# Patient Record
Sex: MALE | Race: WHITE | NOT HISPANIC OR LATINO | Employment: STUDENT | URBAN - METROPOLITAN AREA
[De-identification: names, ages, dates, MRNs, and addresses within clinical notes are randomized per-mention and may not be internally consistent; named-entity substitution may affect disease eponyms.]

---

## 2020-02-15 ENCOUNTER — HOSPITAL ENCOUNTER (EMERGENCY)
Facility: CLINIC | Age: 20
Discharge: HOME | End: 2020-02-16
Attending: EMERGENCY MEDICINE | Admitting: EMERGENCY MEDICINE
Payer: COMMERCIAL

## 2020-02-15 DIAGNOSIS — R10.11 RUQ ABDOMINAL PAIN: ICD-10-CM

## 2020-02-15 PROCEDURE — 76705 ECHO EXAM OF ABDOMEN: CPT | Mod: 26 | Performed by: EMERGENCY MEDICINE

## 2020-02-15 PROCEDURE — 99285 EMERGENCY DEPT VISIT HI MDM: CPT | Mod: 25 | Performed by: EMERGENCY MEDICINE

## 2020-02-15 PROCEDURE — 96375 TX/PRO/DX INJ NEW DRUG ADDON: CPT

## 2020-02-15 PROCEDURE — 99285 EMERGENCY DEPT VISIT HI MDM: CPT | Mod: 25

## 2020-02-15 PROCEDURE — 96374 THER/PROPH/DIAG INJ IV PUSH: CPT

## 2020-02-15 PROCEDURE — 76705 ECHO EXAM OF ABDOMEN: CPT

## 2020-02-15 NOTE — ED AVS SNAPSHOT
[facility] Emergency Department  [address] [facility] [address]  Phone:  [phone]  Fax:  [phone]                                    Marcell Ochoa   MRN: [id number]    Department:  [facility] Emergency Department   Date of Visit:  2/15/2020           After Visit Summary Signature Page    I have received my discharge instructions, and my questions have been answered. I have discussed any challenges I see with this plan with the nurse or doctor.    ..........................................................................................................................................  Patient/Patient Representative Signature      ..........................................................................................................................................  Patient Representative Print Name and Relationship to Patient    ..................................................               ................................................  Date                                   Time    ..........................................................................................................................................  Reviewed by Signature/Title    ...................................................              ..............................................  Date                                               Time          22EPIC Rev 08/18

## 2020-02-16 ENCOUNTER — ANCILLARY PROCEDURE (OUTPATIENT)
Dept: ULTRASOUND IMAGING | Facility: CLINIC | Age: 20
End: 2020-02-16
Attending: EMERGENCY MEDICINE

## 2020-02-16 VITALS
DIASTOLIC BLOOD PRESSURE: 58 MMHG | RESPIRATION RATE: 22 BRPM | BODY MASS INDEX: 25.09 KG/M2 | OXYGEN SATURATION: 95 % | HEART RATE: 72 BPM | TEMPERATURE: 97.7 F | WEIGHT: 165 LBS | SYSTOLIC BLOOD PRESSURE: 118 MMHG

## 2020-02-16 LAB
ALBUMIN SERPL-MCNC: 4.2 G/DL (ref 3.4–5)
ALP SERPL-CCNC: 72 U/L (ref 40–150)
ALT SERPL W P-5'-P-CCNC: 23 U/L (ref 0–70)
ANION GAP SERPL CALCULATED.3IONS-SCNC: 6 MMOL/L (ref 3–14)
AST SERPL W P-5'-P-CCNC: 24 U/L (ref 0–45)
BASOPHILS # BLD AUTO: 0.1 10E9/L (ref 0–0.2)
BASOPHILS NFR BLD AUTO: 0.4 %
BILIRUB SERPL-MCNC: 0.5 MG/DL (ref 0.2–1.3)
BUN SERPL-MCNC: 20 MG/DL (ref 7–30)
CALCIUM SERPL-MCNC: 9 MG/DL (ref 8.5–10.1)
CHLORIDE SERPL-SCNC: 108 MMOL/L (ref 94–109)
CO2 SERPL-SCNC: 26 MMOL/L (ref 20–32)
CREAT SERPL-MCNC: 1.15 MG/DL (ref 0.66–1.25)
DIFFERENTIAL METHOD BLD: ABNORMAL
EOSINOPHIL # BLD AUTO: 0.1 10E9/L (ref 0–0.7)
EOSINOPHIL NFR BLD AUTO: 0.7 %
ERYTHROCYTE [DISTWIDTH] IN BLOOD BY AUTOMATED COUNT: 12.1 % (ref 10–15)
GFR SERPL CREATININE-BSD FRML MDRD: >90 ML/MIN/{1.73_M2}
GLUCOSE SERPL-MCNC: 103 MG/DL (ref 70–99)
HCT VFR BLD AUTO: 45 % (ref 40–53)
HGB BLD-MCNC: 15.4 G/DL (ref 13.3–17.7)
IMM GRANULOCYTES # BLD: 0 10E9/L (ref 0–0.4)
IMM GRANULOCYTES NFR BLD: 0.3 %
LIPASE SERPL-CCNC: 56 U/L (ref 73–393)
LYMPHOCYTES # BLD AUTO: 3.3 10E9/L (ref 0.8–5.3)
LYMPHOCYTES NFR BLD AUTO: 23.7 %
MCH RBC QN AUTO: 31.4 PG (ref 26.5–33)
MCHC RBC AUTO-ENTMCNC: 34.2 G/DL (ref 31.5–36.5)
MCV RBC AUTO: 92 FL (ref 78–100)
MONOCYTES # BLD AUTO: 0.9 10E9/L (ref 0–1.3)
MONOCYTES NFR BLD AUTO: 6.5 %
NEUTROPHILS # BLD AUTO: 9.6 10E9/L (ref 1.6–8.3)
NEUTROPHILS NFR BLD AUTO: 68.4 %
NRBC # BLD AUTO: 0 10*3/UL
NRBC BLD AUTO-RTO: 0 /100
PLATELET # BLD AUTO: 343 10E9/L (ref 150–450)
POTASSIUM SERPL-SCNC: 3.7 MMOL/L (ref 3.4–5.3)
PROT SERPL-MCNC: 7.4 G/DL (ref 6.8–8.8)
RBC # BLD AUTO: 4.91 10E12/L (ref 4.4–5.9)
SODIUM SERPL-SCNC: 140 MMOL/L (ref 133–144)
WBC # BLD AUTO: 14 10E9/L (ref 4–11)

## 2020-02-16 PROCEDURE — 83690 ASSAY OF LIPASE: CPT | Performed by: EMERGENCY MEDICINE

## 2020-02-16 PROCEDURE — 80053 COMPREHEN METABOLIC PANEL: CPT | Performed by: EMERGENCY MEDICINE

## 2020-02-16 PROCEDURE — 85025 COMPLETE CBC W/AUTO DIFF WBC: CPT | Performed by: EMERGENCY MEDICINE

## 2020-02-16 PROCEDURE — 25000128 H RX IP 250 OP 636: Performed by: EMERGENCY MEDICINE

## 2020-02-16 RX ORDER — KETOROLAC TROMETHAMINE 15 MG/ML
15 INJECTION, SOLUTION INTRAMUSCULAR; INTRAVENOUS ONCE
Status: COMPLETED | OUTPATIENT
Start: 2020-02-16 | End: 2020-02-16

## 2020-02-16 RX ORDER — MORPHINE SULFATE 2 MG/ML
2-4 INJECTION, SOLUTION INTRAMUSCULAR; INTRAVENOUS EVERY 30 MIN PRN
Status: DISCONTINUED | OUTPATIENT
Start: 2020-02-16 | End: 2020-02-16 | Stop reason: HOSPADM

## 2020-02-16 RX ORDER — FENTANYL CITRATE 50 UG/ML
50-100 INJECTION, SOLUTION INTRAMUSCULAR; INTRAVENOUS EVERY 5 MIN PRN
Status: DISCONTINUED | OUTPATIENT
Start: 2020-02-16 | End: 2020-02-16 | Stop reason: HOSPADM

## 2020-02-16 RX ADMIN — FENTANYL CITRATE 100 MCG: 50 INJECTION, SOLUTION INTRAMUSCULAR; INTRAVENOUS at 00:07

## 2020-02-16 RX ADMIN — KETOROLAC TROMETHAMINE 15 MG: 15 INJECTION, SOLUTION INTRAMUSCULAR; INTRAVENOUS at 00:40

## 2020-02-16 RX ADMIN — MORPHINE SULFATE 2 MG: 2 INJECTION, SOLUTION INTRAMUSCULAR; INTRAVENOUS at 00:42

## 2020-02-16 ASSESSMENT — ENCOUNTER SYMPTOMS
HEADACHES: 0
BACK PAIN: 0
CHEST TIGHTNESS: 0
LIGHT-HEADEDNESS: 0
WOUND: 0
NUMBNESS: 0
CONSTIPATION: 0
NECK STIFFNESS: 0
NAUSEA: 0
APPETITE CHANGE: 0
VOMITING: 0
FATIGUE: 0
COUGH: 1
DIARRHEA: 0
DYSURIA: 0
ABDOMINAL PAIN: 1
CHILLS: 0
FEVER: 0
SHORTNESS OF BREATH: 0
WEAKNESS: 0

## 2020-02-16 NOTE — DISCHARGE INSTRUCTIONS
The exact cause of your pain is not entirely clear but appears to be due to spasms of your gallbladder.  I recommend following up with your primary care provider with consideration for obtaining another ultrasound as an outpatient if you have any further symptoms.

## 2020-02-16 NOTE — ED PROVIDER NOTES
"  History     Chief Complaint   Patient presents with     Abdominal Pain     RUQ- after eating a large meal.  Tried going into a hot tub and vomiting with no relief.  Pain is described as \"like I got a rib broken, but I don't.  Like a cramp\"     HPI  Marcell Ochoa is a 19 year old male with no significant past medical history presenting for evaluation of severe right upper quadrant abdominal pain.  Symptoms began acutely approximately 1 hour after having a large greasy meal.  Patient reports acute onset of sharp pain in the right upper quadrant.  Patient reports some slight waxing waning of the pain initially but has become more constant and severe.  He reports sharp pain underneath his right chest wall without any radiation or migration.  Patient denies any nausea or vomiting but does report he tried to induce vomiting without success.  Patient also reports he tried to have a bowel movement but was unable.  Patient reports his last normal bowel movement was earlier today and was normal.  Denies fever or chills.  Denies any previous similar symptoms in the past.  No personal or family history of gallbladder problems.  No previous history of surgery.  No known bad food exposure.  Pain has become progressively worsened in the same area so patient came in for evaluation.  Denies chest pain.  Has had a mild upper respiratory symptoms for a week but this is unchanged.  Denies any cough or difficulty breathing.    Allergies:  No Known Allergies    Problem List:    There are no active problems to display for this patient.       Past Medical History:    Past Medical History:   Diagnosis Date     NO ACTIVE PROBLEMS        Past Surgical History:    Past Surgical History:   Procedure Laterality Date     NO HISTORY OF SURGERY         Family History:    Family History   Problem Relation Age of Onset     Eye Disorder Maternal Grandmother         glaucoma     Respiratory Maternal Grandmother         emphycema     Diabetes Maternal " Grandfather      Hypertension Maternal Grandfather      Arthritis Maternal Grandfather      Eye Disorder Maternal Grandfather         cataracts/optic nerve stroke     Heart Disease Maternal Grandfather         stints put in     Respiratory Maternal Grandfather         sleep apnea     Thyroid Disease Paternal Grandmother      Neurologic Disorder Paternal Grandfather         lymes disease     Neurologic Disorder Other         down syndrome     Diabetes Other        Social History:  Marital Status:  Single [1]  Social History     Tobacco Use     Smoking status: Never Smoker     Smokeless tobacco: Never Used   Substance Use Topics     Alcohol use: Yes     Comment: Alcohol tonight- 2 beers     Drug use: No        Medications:    NO ACTIVE MEDICATIONS          Review of Systems   Constitutional: Negative for appetite change, chills, fatigue and fever.   HENT: Negative for congestion.    Respiratory: Positive for cough (mild). Negative for chest tightness and shortness of breath.    Cardiovascular: Negative for chest pain.   Gastrointestinal: Positive for abdominal pain. Negative for constipation, diarrhea, nausea and vomiting.   Genitourinary: Negative for decreased urine volume, dysuria, scrotal swelling and testicular pain.   Musculoskeletal: Negative for back pain and neck stiffness.   Skin: Negative for rash and wound.   Neurological: Negative for weakness, light-headedness, numbness and headaches.   All other systems reviewed and are negative.      Physical Exam   BP: 125/61  Pulse: 66  Heart Rate: 90  Temp: 97.7  F (36.5  C)  Resp: 22  Weight: 74.8 kg (165 lb)  SpO2: 98 %      Physical Exam  Vitals signs and nursing note reviewed.   Constitutional:       General: He is in acute distress.      Appearance: He is well-developed. He is diaphoretic.      Comments: Patient appears acutely uncomfortable, mildly pale and slightly diaphoretic   HENT:      Head: Normocephalic and atraumatic.      Mouth/Throat:      Mouth:  Mucous membranes are moist.   Cardiovascular:      Rate and Rhythm: Normal rate.      Heart sounds: Normal heart sounds.   Pulmonary:      Effort: Pulmonary effort is normal.      Breath sounds: Normal breath sounds.   Abdominal:      General: Abdomen is flat. Bowel sounds are increased.      Palpations: Abdomen is soft.      Tenderness: There is abdominal tenderness (Notably tender in the right upper quadrant without tenderness in the rest of the abdomen) in the right upper quadrant. There is no rebound. Positive signs include Rowe's sign.   Skin:     General: Skin is warm.      Capillary Refill: Capillary refill takes less than 2 seconds.   Neurological:      Mental Status: He is alert and oriented to person, place, and time.   Psychiatric:         Mood and Affect: Mood normal.         ED Course        Procedures           Results for orders placed or performed during the hospital encounter of 02/15/20 (from the past 24 hour(s))   CBC with platelets differential   Result Value Ref Range    WBC 14.0 (H) 4.0 - 11.0 10e9/L    RBC Count 4.91 4.4 - 5.9 10e12/L    Hemoglobin 15.4 13.3 - 17.7 g/dL    Hematocrit 45.0 40.0 - 53.0 %    MCV 92 78 - 100 fl    MCH 31.4 26.5 - 33.0 pg    MCHC 34.2 31.5 - 36.5 g/dL    RDW 12.1 10.0 - 15.0 %    Platelet Count 343 150 - 450 10e9/L    Diff Method Automated Method     % Neutrophils 68.4 %    % Lymphocytes 23.7 %    % Monocytes 6.5 %    % Eosinophils 0.7 %    % Basophils 0.4 %    % Immature Granulocytes 0.3 %    Nucleated RBCs 0 0 /100    Absolute Neutrophil 9.6 (H) 1.6 - 8.3 10e9/L    Absolute Lymphocytes 3.3 0.8 - 5.3 10e9/L    Absolute Monocytes 0.9 0.0 - 1.3 10e9/L    Absolute Eosinophils 0.1 0.0 - 0.7 10e9/L    Absolute Basophils 0.1 0.0 - 0.2 10e9/L    Abs Immature Granulocytes 0.0 0 - 0.4 10e9/L    Absolute Nucleated RBC 0.0    Comprehensive metabolic panel   Result Value Ref Range    Sodium 140 133 - 144 mmol/L    Potassium 3.7 3.4 - 5.3 mmol/L    Chloride 108 94 - 109  mmol/L    Carbon Dioxide 26 20 - 32 mmol/L    Anion Gap 6 3 - 14 mmol/L    Glucose 103 (H) 70 - 99 mg/dL    Urea Nitrogen 20 7 - 30 mg/dL    Creatinine 1.15 0.66 - 1.25 mg/dL    GFR Estimate >90 >60 mL/min/[1.73_m2]    GFR Estimate If Black >90 >60 mL/min/[1.73_m2]    Calcium 9.0 8.5 - 10.1 mg/dL    Bilirubin Total 0.5 0.2 - 1.3 mg/dL    Albumin 4.2 3.4 - 5.0 g/dL    Protein Total 7.4 6.8 - 8.8 g/dL    Alkaline Phosphatase 72 40 - 150 U/L    ALT 23 0 - 70 U/L    AST 24 0 - 45 U/L   Lipase   Result Value Ref Range    Lipase 56 (L) 73 - 393 U/L   POC US ABDOMEN LIMITED    [facility] Procedure Note      Limited Bedside ED Gallbladder  Ultrasound:    PROCEDURE: PERFORMED BY: Dr. Petr Sunshine MD  INDICATIONS:  RUQ/Epigastric Pain  PROBE:  Low frequency convex probe  BODY LOCATION: Abdomen  FINDINGS:   An ultrasound of the gallbladder was performed using longitudinal and transverse views.  Gallstone(s):  No large stones present. Some shadowing present near the neck of the gallbladder may indicated some small stones in this location  Gallbladder sludge:  Absent  Sonographic Rowe's sign:  Present  Gallbladder wall thickening (greater than 4 mm):  Absent  Pericholecystic fluid: Absent  Common bile duct (dilated if internal diameter greater than 6 mm): 4.4 mm  INTERPRETATION: The gallbladder is dilated and shows some shadowing in the neck of the gallbladder suggestive of some small stones and patient does appear to have a sonographic Rowe sign however, there is no other evidence to suggest acute cholecystitis.  IMAGE DOCUMENTATION: Images were archived to PACs system.           Medications   fentaNYL (PF) (SUBLIMAZE) injection  mcg (100 mcg Intravenous Given 2/16/20 0007)   morphine (PF) injection 2-4 mg (2 mg Intravenous Given 2/16/20 0042)   ketorolac (TORADOL) injection 15 mg (15 mg Intravenous Given 2/16/20 0040)      1:59 AM Patient re-assessed: Pain fully resolved.  Slight  tenderness in the right upper quadrant on palpation but only minimal.      Assessments & Plan (with Medical Decision Making)  19-year-old male previously healthy presenting for evaluation of acute onset of severe right upper quadrant abdominal pain symptoms tonight after eating a large greasy meal.  Patient notably tender in the right upper quadrant with a positive Rowe's sign.  Symptoms suggestive of biliary colic.  Bedside ultrasound showed 2 small shadowing stones near the neck of the gallbladder with a distended gallbladder but no evidence of cholecystitis.  Labs showed a mild white count but no evidence of elevated LFTs.  Patient's pain controlled with medications as above with complete resolution of his pain.  Symptoms highly suggestive of biliary colic although only the small shadowing stones identified on ultrasound.  Recommended primary care follow-up with consideration for repeat ultrasound as an outpatient, especially if he has recurrent symptoms.  Patient also advised that he has severe recurrent symptoms that do not resolve within half an hour to an hour, he should return for repeat evaluation.     I have reviewed the nursing notes.    I have reviewed the findings, diagnosis, plan and need for follow up with the patient.       New Prescriptions    No medications on file       Final diagnoses:   RUQ abdominal pain - Possible biliary colic       2/15/2020   [facility] EMERGENCY DEPARTMENT     Sunshine, Petr Faith MD  02/16/20 1214

## 2020-02-21 ENCOUNTER — OFFICE VISIT (OUTPATIENT)
Dept: FAMILY MEDICINE | Facility: CLINIC | Age: 20
End: 2020-02-21
Payer: COMMERCIAL

## 2020-02-21 VITALS
SYSTOLIC BLOOD PRESSURE: 124 MMHG | DIASTOLIC BLOOD PRESSURE: 70 MMHG | WEIGHT: 167 LBS | HEIGHT: 68 IN | HEART RATE: 76 BPM | BODY MASS INDEX: 25.31 KG/M2 | TEMPERATURE: 97 F

## 2020-02-21 DIAGNOSIS — R10.11 RUQ ABDOMINAL PAIN: Primary | ICD-10-CM

## 2020-02-21 DIAGNOSIS — K80.50 BILIARY COLIC: ICD-10-CM

## 2020-02-21 PROCEDURE — 99203 OFFICE O/P NEW LOW 30 MIN: CPT | Performed by: PHYSICIAN ASSISTANT

## 2020-02-21 ASSESSMENT — MIFFLIN-ST. JEOR: SCORE: 1742.01

## 2020-02-21 ASSESSMENT — PAIN SCALES - GENERAL: PAINLEVEL: NO PAIN (0)

## 2020-02-21 NOTE — PATIENT INSTRUCTIONS
I think things are going the right direction    No further work up needed if things continue to improve. Resume your normal diet and activity level     IF you continue to have some nagging pain or symptoms, especially notable after eating, let me know as I would want to repeat the ultrasound

## 2020-02-21 NOTE — PROGRESS NOTES
"Subjective     Marcell Ochoa is a 19 year old male who presents to clinic today for the following health issues:    HPI   ED/UC Followup:    Facility:  [facility] Emergency Department   Date of visit: 2/15/2020   Reason for visit: Abdominal Pain (R)   Current Status: He said it has subsided since the visit. Feeling some soreness in the area when waking up in the morning.            BP Readings from Last 3 Encounters:   02/21/20 124/70   02/16/20 118/58   04/27/16 137/64 (97 %/ 38 %)*     *BP percentiles are based on the 2017 AAP Clinical Practice Guideline for boys    Wt Readings from Last 3 Encounters:   02/21/20 75.8 kg (167 lb)   02/15/20 74.8 kg (165 lb)   04/27/16 69.4 kg (153 lb) (72 %)*     * Growth percentiles are based on [facility] (Boys, 2-20 Years) data.            Developed acutely - had eaten at [employer] and initially thought he had just eaten too much but pain continued to increase so went to the ED  Labs with elevated WBC (elevated neutrophils)  US without acute cholecystitis but shadowing suggestive of possible stone burden  Pain subsided in ED so was discharged to home to monitor closely    He reports pain has since improved  Appetite is back to normal  No stool or urinary issues  Will sometimes feel like his muscles are \"sore\" in that area in the morning but otherwise no residual symptoms         Reviewed and updated as needed this visit by Provider         Review of Systems   Remainder of ROS obtained and found to be negative other than that which was documented above        Objective    /70   Pulse 76   Temp 97  F (36.1  C) (Tympanic)   Ht 1.727 m (5' 8\")   Wt 75.8 kg (167 lb)   BMI 25.39 kg/m    Body mass index is 25.39 kg/m .  Physical Exam   GENERAL: healthy, alert and no distress  ABDOMEN: soft, nontender, no hepatosplenomegaly, no masses and bowel sounds normal    Diagnostic Test Results:  Labs reviewed in Epic        Assessment & Plan       ICD-10-CM    1. RUQ abdominal pain R10.11  "   2. Biliary colic K80.50      Resolution of symptoms that brought him to the ED  Discussed no further work up needed at this time but if he has another episode OR if even more mild sypmtoms recur such as a dull pain or discomfort after eating, then he should notify me at which time we would start with repeat imaging (US of abdomen)    Return in about 3 months (around 5/21/2020), or if symptoms worsen or fail to improve.    Argenis Means PA-C  [facility]

## 2020-06-24 ENCOUNTER — OFFICE VISIT (OUTPATIENT)
Dept: FAMILY MEDICINE | Facility: CLINIC | Age: 20
End: 2020-06-24
Payer: COMMERCIAL

## 2020-06-24 VITALS
BODY MASS INDEX: 25.55 KG/M2 | HEIGHT: 68 IN | OXYGEN SATURATION: 97 % | HEART RATE: 76 BPM | RESPIRATION RATE: 16 BRPM | WEIGHT: 168.6 LBS | TEMPERATURE: 97.5 F | SYSTOLIC BLOOD PRESSURE: 136 MMHG | DIASTOLIC BLOOD PRESSURE: 74 MMHG

## 2020-06-24 DIAGNOSIS — H61.22 IMPACTED CERUMEN OF LEFT EAR: Primary | ICD-10-CM

## 2020-06-24 PROCEDURE — 69210 REMOVE IMPACTED EAR WAX UNI: CPT | Mod: LT | Performed by: FAMILY MEDICINE

## 2020-06-24 PROCEDURE — 99213 OFFICE O/P EST LOW 20 MIN: CPT | Mod: 25 | Performed by: FAMILY MEDICINE

## 2020-06-24 ASSESSMENT — MIFFLIN-ST. JEOR: SCORE: 1749.26

## 2020-06-24 NOTE — PATIENT INSTRUCTIONS
Due to difficulty and pain in removing the earwax, consult ENT for further removal.    You will be called by  in the next 1-2 business days.      Thank you for choosing [facility].  You may be receiving an email and/or telephone survey request from [facility] regarding your visit today.  Please take a few minutes to respond to the survey to let us know how we are doing.      If you have questions or concerns, please contact us via [employer] or you can contact your care team at [phone].    Our Clinic hours are:  Monday 6:40 am  to 7:00 pm  Tuesday -Friday 6:40 am to 5:00 pm    The [place] outpatient lab hours are:  Monday - Friday 6:10 am to 4:45 pm  Saturdays 7:00 am to 11:00 am  Appointments are required, call [phone]    If you have clinical questions after hours or would like to schedule an appointment,  call the clinic at [phone].    Patient Education     Impacted Earwax     Inner ear structures including ear canal and eardrum.     Impacted earwax is a buildup of the natural wax in the ear (cerumen). Impacted earwax is very common. It can cause symptoms such as hearing loss. It can also make it difficult for a doctor to examine your ear.  Understanding earwax  Tiny glands in your ear make substances that combine with dead skin cells to form earwax. Earwax helps protect your ear canal from water, dirt, infection, and injury. Over time, earwax travels from the inner part of your ear canal to the entrance of the canal. Then it falls away naturally. But in some cases, it can t travel to the entrance of the canal. This may be because of a health condition or objects put in the ear. With age, earwax tends to become harder and less fluid. Older adults are more likely to have problems with earwax buildup.  What causes impacted earwax?  Earwax can build up because of many health conditions. Some cause a physical blockage. Others cause too much earwax to be made. Health  conditions that can cause earwax buildup include:    Use of cotton swabs to clean deep in the ear canal    Bony blockage in the ear (osteoma or exostoses)    Infections, such as  infection of the outer ear (external otitis)    Skin disease, such as eczema    Autoimmune diseases, such as lupus    A narrowed ear canal from birth, chronic inflammation, or injury    Too much earwax because of injury    Too much earwax because of  water in the ear canal  Objects repeatedly placed in the ear can also cause impacted earwax. For example, putting cotton swabs in the ear may push the wax deeper into the ear. Over time, this may cause blockage. Hearing aids, swimming plugs, and swim molds can cause the same problem when used again and again.  In some cases, the cause of impacted earwax is not known.  Symptoms of impacted earwax  Excess earwax usually does not cause any symptoms, unless there is a large amount of buildup. Then it may cause symptoms such as:    Hearing loss    Earache    Sense of ear fullness    Itching in the ear    Odor from the ear    Ear drainage    Dizziness    Ringing in the ears    Cough  Treatment for impacted earwax  If you don t have symptoms, you may not need treatment. Often, the earwax goes away on its own with time. If you have symptoms, you may have one or more treatments such as:    Eardrops to soften the earwax. This helps it leave the ear over time.    Rinsing (irrigation) of the ear canal with water. This is done in a doctor s office.    Removal of the earwax with small tools. This is also done in a doctor s office.  In rare cases, some treatments for earwax removal may cause complications such as:    Infection of the outer ear (otitis external)    Earache    Short-term hearing loss    Dizziness    Water trapped in the ear canal    Hole in the eardrum    Ringing in the ears    Bleeding from the ear  Talk with your healthcare provider about which risks apply most to you.  Healthcare providers  do not advise use of ear candles or ear vacuum kits. These methods are not shown to work and may cause problems.  Preventing impacted earwax  You may not be able to prevent impacted earwax if you have a health condition that causes it, such as eczema. In other cases, you may be able to prevent earwax buildup by:    Using ear drops once a week    Having routine cleaning of the ear about every 6 months    Not using cotton swabs in the ear  Call your healthcare provider if you have symptoms of impacted earwax. Also call right away if you have severe symptoms after earwax removal. These may include bleeding or severe ear pain.  Date Last Reviewed: 5/1/2017 2000-2019 [employer]. [address] [place] [address], [place] [address]. All rights reserved. This information is not intended as a substitute for professional medical care. Always follow your healthcare professional's instructions.

## 2020-07-14 NOTE — PROGRESS NOTES
Chief Complaint   Patient presents with     Ear Problem     Check left ear feels plugged for about 1 month/hx of swimmers ear/right ear is starting to bother him     History of Present Illness   Marcell Ochoa is a 19 year old male who presents to me today for ear evaluation. I am seeing this patient in consultation for impacted cerumen left ear at the request of the provider Dr. Vang.  The patient is concerned about possible ear wax causing a plugged ear. They have had their ears cleaned out before. The patient denies significant hearing changes, otalgia, otorrhea, bloody otorrhea, dizziness, tinnitus, vertigo.  No history of ear surgery or chronic ear disease.     Past Medical History  There is no problem list on file for this patient.    Current Medications    Current Outpatient Medications:      NO ACTIVE MEDICATIONS, ., Disp: , Rfl:     Allergies  No Known Allergies    Social History  Social History     Socioeconomic History     Marital status: Single     Spouse name: Not on file     Number of children: 0     Years of education: 7th     Highest education level: Not on file   Occupational History     Employer: CHILD   Social Needs     Financial resource strain: Not on file     Food insecurity     Worry: Not on file     Inability: Not on file     Transportation needs     Medical: Not on file     Non-medical: Not on file   Tobacco Use     Smoking status: Never Smoker     Smokeless tobacco: Never Used   Substance and Sexual Activity     Alcohol use: Yes     Comment: little     Drug use: No     Sexual activity: Never   Lifestyle     Physical activity     Days per week: Not on file     Minutes per session: Not on file     Stress: Not on file   Relationships     Social connections     Talks on phone: Not on file     Gets together: Not on file     Attends Religion service: Not on file     Active member of club or organization: Not on file     Attends meetings of clubs or organizations: Not on file     Relationship  "status: Not on file     Intimate partner violence     Fear of current or ex partner: Not on file     Emotionally abused: Not on file     Physically abused: Not on file     Forced sexual activity: Not on file   Other Topics Concern     Not on file   Social History Narrative     Not on file       Family History  Family History   Problem Relation Age of Onset     Eye Disorder Maternal Grandmother         glaucoma     Respiratory Maternal Grandmother         emphycema     Diabetes Maternal Grandfather      Hypertension Maternal Grandfather      Arthritis Maternal Grandfather      Eye Disorder Maternal Grandfather         cataracts/optic nerve stroke     Heart Disease Maternal Grandfather         stints put in     Respiratory Maternal Grandfather         sleep apnea     Thyroid Disease Paternal Grandmother      Neurologic Disorder Paternal Grandfather         lymes disease     Neurologic Disorder Other         down syndrome     Diabetes Other        Review of Systems  As per HPI and PMHx, otherwise 7 system review of the head and neck negative.    Physical Exam  /59 (BP Location: Right arm, Patient Position: Sitting, Cuff Size: Adult Regular)   Pulse 88   Temp 99  F (37.2  C) (Tympanic)   Ht 1.727 m (5' 8\")   Wt 76.5 kg (168 lb 9.6 oz)   BMI 25.64 kg/m    GENERAL: Patient is a pleasant, cooperative 19 year old male in no acute distress.  HEAD: Normocephalic, atraumatic.  Hair and scalp are normal.  EYES: Pupils are equal, round, reactive to light and accommodation.  Extraocular movements are intact.  The sclera nonicteric without injection.  The extraocular structures are normal.  EARS: See below.  NEUROLOGIC: Cranial nerves II through XII are grossly intact.  Voice is strong.  Facial nerve examination limited due to the patient wearing a mask cARDIOVASCULAR: Extremities are warm and well-perfused.  No significant peripheral edema.  RESPIRATORY: Patient has nonlabored breathing without cough, wheeze, " stridor.  PSYCHIATRIC: Patient is alert and oriented.  Mood and affect appear normal.  SKIN: Warm and dry.  No scalp, face, or neck lesions noted.    Physical Exam and Procedure    EARS: Normal shape and symmetry.  No tenderness when palpating the mastoid or tragal areas bilaterally.  The ears were then examined under the otic binocular microscope to perform cerumen removal.  Otoscopic exam on the right reveals a moderate amount of cerumen.  The cerumen was removed with an alligator forceps.  The right tympanic membrane was round, intact without evidence of effusion.  No retraction, granulation, drainage, or evidence of cholesteatoma.      Attention was turned to the left ear.  Otoscopic exam on the left reveals impacted cerumen down to the level of the tympanic membrane.  The cerumen was removed with curette, alligator, and #7 suction.  The left tympanic membrane was round, intact without evidence of effusion.  No retraction, granulation, drainage, or evidence of cholesteatoma.  Due to significant amount of cerumen down the level tympanic membrane and impacted into the anterior sulcus, this procedure took additional time, effort, and technical skill normally required for the procedure.      Assessment and Plan     ICD-10-CM    1. Impacted cerumen of left ear  H61.22 Remove Cerumen     It was my pleasure seeing Marcell Ochoa today in clinic.  The patient had left cerumen impaction today successfully removed in office.  We spent the remainder of today's visit on education including not putting any instrument in the ear.  The patient can use over-the-counter items such as Debrox or Ceruminex.     The patient will follow up in 1 year for repeat ear check and cleaning.    Elan Chandler MD  Department of Otolarygology-Head and Neck Surgery  [facility]

## 2020-07-15 ENCOUNTER — OFFICE VISIT (OUTPATIENT)
Dept: OTOLARYNGOLOGY | Facility: CLINIC | Age: 20
End: 2020-07-15
Payer: COMMERCIAL

## 2020-07-15 VITALS
HEART RATE: 88 BPM | SYSTOLIC BLOOD PRESSURE: 114 MMHG | TEMPERATURE: 99 F | HEIGHT: 68 IN | DIASTOLIC BLOOD PRESSURE: 59 MMHG | BODY MASS INDEX: 25.55 KG/M2 | WEIGHT: 168.6 LBS

## 2020-07-15 DIAGNOSIS — H61.22 IMPACTED CERUMEN OF LEFT EAR: Primary | ICD-10-CM

## 2020-07-15 PROCEDURE — 69210 REMOVE IMPACTED EAR WAX UNI: CPT | Mod: 22 | Performed by: OTOLARYNGOLOGY

## 2020-07-15 PROCEDURE — 99203 OFFICE O/P NEW LOW 30 MIN: CPT | Mod: 25 | Performed by: OTOLARYNGOLOGY

## 2020-07-15 ASSESSMENT — MIFFLIN-ST. JEOR: SCORE: 1749.26

## 2020-07-15 NOTE — PATIENT INSTRUCTIONS
Per physician instructions      If you have questions or concerns on any instructions given to you by your provider today or if you need to schedule an appointment, you can reach us at [phone].

## 2020-07-15 NOTE — LETTER
7/15/2020         RE: Marcell Ochoa  [address]  [facility] [address]        Dear Colleague,    Thank you for referring your patient, Marcell Ochoa, to the [facility]. Please see a copy of my visit note below.    Chief Complaint   Patient presents with     Ear Problem     Check left ear feels plugged for about 1 month/hx of swimmers ear/right ear is starting to bother him     History of Present Illness   Marcell Ochoa is a 19 year old male who presents to me today for ear evaluation. I am seeing this patient in consultation for impacted cerumen left ear at the request of the provider Dr. Vang.  The patient is concerned about possible ear wax causing a plugged ear. They have had their ears cleaned out before. The patient denies significant hearing changes, otalgia, otorrhea, bloody otorrhea, dizziness, tinnitus, vertigo.  No history of ear surgery or chronic ear disease.     Past Medical History  There is no problem list on file for this patient.    Current Medications    Current Outpatient Medications:      NO ACTIVE MEDICATIONS, ., Disp: , Rfl:     Allergies  No Known Allergies    Social History  Social History     Socioeconomic History     Marital status: Single     Spouse name: Not on file     Number of children: 0     Years of education: 7th     Highest education level: Not on file   Occupational History     Employer: CHILD   Social Needs     Financial resource strain: Not on file     Food insecurity     Worry: Not on file     Inability: Not on file     Transportation needs     Medical: Not on file     Non-medical: Not on file   Tobacco Use     Smoking status: Never Smoker     Smokeless tobacco: Never Used   Substance and Sexual Activity     Alcohol use: Yes     Comment: little     Drug use: No     Sexual activity: Never   Lifestyle     Physical activity     Days per week: Not on file     Minutes per session: Not on file     Stress: Not on file   Relationships     Social connections  "    Talks on phone: Not on file     Gets together: Not on file     Attends [employer] service: Not on file     Active member of club or organization: Not on file     Attends meetings of clubs or organizations: Not on file     Relationship status: Not on file     Intimate partner violence     Fear of current or ex partner: Not on file     Emotionally abused: Not on file     Physically abused: Not on file     Forced sexual activity: Not on file   Other Topics Concern     Not on file   Social History Narrative     Not on file       Family History  Family History   Problem Relation Age of Onset     Eye Disorder Maternal Grandmother         glaucoma     Respiratory Maternal Grandmother         emphycema     Diabetes Maternal Grandfather      Hypertension Maternal Grandfather      Arthritis Maternal Grandfather      Eye Disorder Maternal Grandfather         cataracts/optic nerve stroke     Heart Disease Maternal Grandfather         stints put in     Respiratory Maternal Grandfather         sleep apnea     Thyroid Disease Paternal Grandmother      Neurologic Disorder Paternal Grandfather         lymes disease     Neurologic Disorder Other         down syndrome     Diabetes Other        Review of Systems  As per HPI and PMHx, otherwise 7 system review of the head and neck negative.    Physical Exam  /59 (BP Location: Right arm, Patient Position: Sitting, Cuff Size: Adult Regular)   Pulse 88   Temp 99  F (37.2  C) (Tympanic)   Ht 1.727 m (5' 8\")   Wt 76.5 kg (168 lb 9.6 oz)   BMI 25.64 kg/m    GENERAL: Patient is a pleasant, cooperative 19 year old male in no acute distress.  HEAD: Normocephalic, atraumatic.  Hair and scalp are normal.  EYES: Pupils are equal, round, reactive to light and accommodation.  Extraocular movements are intact.  The sclera nonicteric without injection.  The extraocular structures are normal.  EARS: See below.  NEUROLOGIC: Cranial nerves II through XII are grossly intact.  Voice is strong. "  Facial nerve examination limited due to the patient wearing a mask cARDIOVASCULAR: Extremities are warm and well-perfused.  No significant peripheral edema.  RESPIRATORY: Patient has nonlabored breathing without cough, wheeze, stridor.  PSYCHIATRIC: Patient is alert and oriented.  Mood and affect appear normal.  SKIN: Warm and dry.  No scalp, face, or neck lesions noted.    Physical Exam and Procedure    EARS: Normal shape and symmetry.  No tenderness when palpating the mastoid or tragal areas bilaterally.  The ears were then examined under the otic binocular microscope to perform cerumen removal.  Otoscopic exam on the right reveals a moderate amount of cerumen.  The cerumen was removed with an alligator forceps.  The right tympanic membrane was round, intact without evidence of effusion.  No retraction, granulation, drainage, or evidence of cholesteatoma.      Attention was turned to the left ear.  Otoscopic exam on the left reveals impacted cerumen down to the level of the tympanic membrane.  The cerumen was removed with curette, alligator, and #7 suction.  The left tympanic membrane was round, intact without evidence of effusion.  No retraction, granulation, drainage, or evidence of cholesteatoma.  Due to significant amount of cerumen down the level tympanic membrane and impacted into the anterior sulcus, this procedure took additional time, effort, and technical skill normally required for the procedure.      Assessment and Plan     ICD-10-CM    1. Impacted cerumen of left ear  H61.22 Remove Cerumen     It was my pleasure seeing Marcell Ochoa today in clinic.  The patient had left cerumen impaction today successfully removed in office.  We spent the remainder of today's visit on education including not putting any instrument in the ear.  The patient can use over-the-counter items such as Debrox or Ceruminex.     The patient will follow up in 1 year for repeat ear check and cleaning.    Elan Chandler,  MD  Department of Otolarygology-Head and Neck Surgery  [facility]       Again, thank you for allowing me to participate in the care of your patient.        Sincerely,        Elan Chandler MD

## 2020-07-15 NOTE — NURSING NOTE
"Initial /59 (BP Location: Right arm, Patient Position: Sitting, Cuff Size: Adult Regular)   Pulse 88   Temp 99  F (37.2  C) (Tympanic)   Ht 1.727 m (5' 8\")   Wt 76.5 kg (168 lb 9.6 oz)   BMI 25.64 kg/m   Estimated body mass index is 25.64 kg/m  as calculated from the following:    Height as of this encounter: 1.727 m (5' 8\").    Weight as of this encounter: 76.5 kg (168 lb 9.6 oz). .    Caro Cline CMA    "

## 2021-04-19 ENCOUNTER — OFFICE VISIT (OUTPATIENT)
Dept: FAMILY MEDICINE | Facility: CLINIC | Age: 21
End: 2021-04-19
Payer: COMMERCIAL

## 2021-04-19 VITALS
HEIGHT: 68 IN | HEART RATE: 88 BPM | WEIGHT: 171 LBS | BODY MASS INDEX: 25.91 KG/M2 | OXYGEN SATURATION: 99 % | TEMPERATURE: 97 F | DIASTOLIC BLOOD PRESSURE: 66 MMHG | SYSTOLIC BLOOD PRESSURE: 114 MMHG

## 2021-04-19 DIAGNOSIS — B07.0 PLANTAR WART: ICD-10-CM

## 2021-04-19 DIAGNOSIS — H61.23 BILATERAL IMPACTED CERUMEN: Primary | ICD-10-CM

## 2021-04-19 PROCEDURE — 17110 DESTRUCTION B9 LES UP TO 14: CPT | Performed by: PHYSICIAN ASSISTANT

## 2021-04-19 PROCEDURE — 69210 REMOVE IMPACTED EAR WAX UNI: CPT | Mod: 50 | Performed by: PHYSICIAN ASSISTANT

## 2021-04-19 PROCEDURE — 99207 PR NO CHARGE LOS: CPT | Performed by: PHYSICIAN ASSISTANT

## 2021-04-19 ASSESSMENT — MIFFLIN-ST. JEOR: SCORE: 1755.15

## 2021-04-19 ASSESSMENT — PAIN SCALES - GENERAL: PAINLEVEL: NO PAIN (0)

## 2021-04-19 NOTE — PROGRESS NOTES
"    Assessment & Plan     (H61.23) Bilateral impacted cerumen  (primary encounter diagnosis)  Comment: attempted irrigation but no success (although did soften wax). Able to remove manually with curette and forceps  Plan: REMOVE IMPACTED CERUMEN            (B07.0) Plantar wart  Comment: reviewed etiology and treatment of warts. Discussed freezing with liquid nitrogen which patient wanted to proceed with in clinic  DIscussed treatment/after care of wart with patient  After consent, 4 warts in total were frozen in 3 consecutive freeze/thaw cycles. Patient tolerated well  Plan: DESTRUCT BENIGN LESION, UP TO 14              BMI:   Estimated body mass index is 26 kg/m  as calculated from the following:    Height as of this encounter: 1.727 m (5' 8\").    Weight as of this encounter: 77.6 kg (171 lb).     No follow-ups on file.    SHANKAR Burris [facility] MARILEE Munguia is a 20 year old who presents for the following health issues    HPI       Patient is here today to get his ears looked at and flushed if needed.     -He also has warts on the bottom of his feet he would like looked at.   Has been trying to treat with the over the counter \"wart kit\" but doesn't seem to be helping    Review of Systems   Remainder of ROS obtained and found to be negative other than that which was documented above        Objective    /66   Pulse 88   Temp 97  F (36.1  C) (Tympanic)   Ht 1.727 m (5' 8\")   Wt 77.6 kg (171 lb)   SpO2 99%   BMI 26.00 kg/m    Body mass index is 26 kg/m .  Physical Exam   GENERAL: healthy, alert and no distress  EARS: b/l cerumen impaction. After removal of wax, TM's visualized, normal  FEET/SKIN:  4 warts total - 2 on the bottom of each foot  On the right foot - wart underneath great toe and between 4th and 5th digit  On the left foot - below 5th digit and over the middle of the foot          "

## 2021-09-14 NOTE — PROGRESS NOTES
Subjective     Marcell Ochoa is a 19 year old male who presents to clinic today for the following health issues:    HPI   Chief Complaint   Patient presents with     Cerumen (impacted)     Patient states he is needing both ears cleaned, not painful or plugged.     136/74  Left ear feels full      Duration: 2 weeks    Description (location/character/radiation): feeling of fulness but no pain or hearing impariment.    Intensity:  mild    Accompanying signs and symptoms: none    History (similar episodes/previous evaluation): has distant hx of needing aural irrigation due to impacted cerumen.    Precipitating or alleviating factors: None    Therapies tried and outcome: None    Uses earphones/earpods often when working out.       There is no problem list on file for this patient.    Past Surgical History:   Procedure Laterality Date     NO HISTORY OF SURGERY         Social History     Tobacco Use     Smoking status: Never Smoker     Smokeless tobacco: Never Used   Substance Use Topics     Alcohol use: Yes     Comment: little     Family History   Problem Relation Age of Onset     Eye Disorder Maternal Grandmother         glaucoma     Respiratory Maternal Grandmother         emphycema     Diabetes Maternal Grandfather      Hypertension Maternal Grandfather      Arthritis Maternal Grandfather      Eye Disorder Maternal Grandfather         cataracts/optic nerve stroke     Heart Disease Maternal Grandfather         stints put in     Respiratory Maternal Grandfather         sleep apnea     Thyroid Disease Paternal Grandmother      Neurologic Disorder Paternal Grandfather         lymes disease     Neurologic Disorder Other         down syndrome     Diabetes Other          Current Outpatient Medications   Medication Sig Dispense Refill     NO ACTIVE MEDICATIONS .       No Known Allergies    Reviewed and updated as needed this visit by Provider  Tobacco  Allergies  Meds  Problems  Med Hx  Surg Hx  Fam Hx         Review of  "Systems   C: NEGATIVE for fever, chills, change in weight  I: NEGATIVE for worrisome rashes, moles or lesions  E: NEGATIVE for vision changes or irritation  ENT/MOUTH: see above  RESP:as above  CV: NEGATIVE for chest pain, palpitations or peripheral edema  GI: NEGATIVE for nausea, abdominal pain, heartburn, or change in bowel habits  M: NEGATIVE for significant arthralgias or myalgia      Objective    /74 (BP Location: Right arm, Patient Position: Chair, Cuff Size: Adult Regular)   Pulse 76   Temp 97.5  F (36.4  C) (Tympanic)   Resp 16   Ht 1.727 m (5' 8\")   Wt 76.5 kg (168 lb 9.6 oz)   SpO2 97%   BMI 25.64 kg/m    Body mass index is 25.64 kg/m .  Physical Exam   GENERAL: healthy, alert and no distress  EYES: pink conjunctivae, no icterus  HEENT:EAMs - Right clear with no TTP, Left impacted cerumen ; tympanic membranes intact and pearly on right but not visualized on left; nose with no congestion    Diagnostic Test Results:  none         Assessment & Plan     Marcell was seen today for cerumen (impacted).    Diagnoses and all orders for this visit:    Impacted cerumen of left ear  -     OTOLARYNGOLOGY REFERRAL       Discussed with patient findings on exam.  Advised on treatment options. Discussed with Patient to try ear curette removal under direct visualization using otoscope. He concurred.  After patient verbally consented to remove cerumen, EAM exposed by gently pulling lobe posteriorly.  Directly visualized cerumen in EAM on left ear.  Unuccessfully removed cerumen on left ear with plastic ear curette. Offered to try alligator forceps, patient concurred. Still not successful, and patient experiences pain when the large chunk of cerumen lodges on canal walls. Advised patient since cerumen lodges, irrigation may also not be successful and may cause retained liquid behind cerumen and cause infection.  Referral to ENT offered. Patient concurred.  Return precautions discussed and given to " patient.          Patient Instructions     Due to difficulty and pain in removing the earwax, consult ENT for further removal.    You will be called by  in the next 1-2 business days.      Thank you for choosing [facility].  You may be receiving an email and/or telephone survey request from [facility] Customer Experience regarding your visit today.  Please take a few minutes to respond to the survey to let us know how we are doing.      If you have questions or concerns, please contact us via [id number] or you can contact your care team at [phone].    Our Clinic hours are:  Monday 6:40 am  to 7:00 pm  Tuesday -Friday 6:40 am to 5:00 pm    The [place] outpatient lab hours are:  Monday - Friday 6:10 am to 4:45 pm  Saturdays 7:00 am to 11:00 am  Appointments are required, call [phone]    If you have clinical questions after hours or would like to schedule an appointment,  call the clinic at [phone].    Patient Education     Impacted Earwax     Inner ear structures including ear canal and eardrum.     Impacted earwax is a buildup of the natural wax in the ear (cerumen). Impacted earwax is very common. It can cause symptoms such as hearing loss. It can also make it difficult for a doctor to examine your ear.  Understanding earwax  Tiny glands in your ear make substances that combine with dead skin cells to form earwax. Earwax helps protect your ear canal from water, dirt, infection, and injury. Over time, earwax travels from the inner part of your ear canal to the entrance of the canal. Then it falls away naturally. But in some cases, it can t travel to the entrance of the canal. This may be because of a health condition or objects put in the ear. With age, earwax tends to become harder and less fluid. Older adults are more likely to have problems with earwax buildup.  What causes impacted earwax?  Earwax can build up because of many health conditions. Some cause a physical blockage. Others  cause too much earwax to be made. Health conditions that can cause earwax buildup include:    Use of cotton swabs to clean deep in the ear canal    Bony blockage in the ear (osteoma or exostoses)    Infections, such as  infection of the outer ear (external otitis)    Skin disease, such as eczema    Autoimmune diseases, such as lupus    A narrowed ear canal from birth, chronic inflammation, or injury    Too much earwax because of injury    Too much earwax because of  water in the ear canal  Objects repeatedly placed in the ear can also cause impacted earwax. For example, putting cotton swabs in the ear may push the wax deeper into the ear. Over time, this may cause blockage. Hearing aids, swimming plugs, and swim molds can cause the same problem when used again and again.  In some cases, the cause of impacted earwax is not known.  Symptoms of impacted earwax  Excess earwax usually does not cause any symptoms, unless there is a large amount of buildup. Then it may cause symptoms such as:    Hearing loss    Earache    Sense of ear fullness    Itching in the ear    Odor from the ear    Ear drainage    Dizziness    Ringing in the ears    Cough  Treatment for impacted earwax  If you don t have symptoms, you may not need treatment. Often, the earwax goes away on its own with time. If you have symptoms, you may have one or more treatments such as:    Eardrops to soften the earwax. This helps it leave the ear over time.    Rinsing (irrigation) of the ear canal with water. This is done in a doctor s office.    Removal of the earwax with small tools. This is also done in a doctor s office.  In rare cases, some treatments for earwax removal may cause complications such as:    Infection of the outer ear (otitis external)    Earache    Short-term hearing loss    Dizziness    Water trapped in the ear canal    Hole in the eardrum    Ringing in the ears    Bleeding from the ear  Talk with your healthcare provider about which risks  apply most to you.  Healthcare providers do not advise use of ear candles or ear vacuum kits. These methods are not shown to work and may cause problems.  Preventing impacted earwax  You may not be able to prevent impacted earwax if you have a health condition that causes it, such as eczema. In other cases, you may be able to prevent earwax buildup by:    Using ear drops once a week    Having routine cleaning of the ear about every 6 months    Not using cotton swabs in the ear  Call your healthcare provider if you have symptoms of impacted earwax. Also call right away if you have severe symptoms after earwax removal. These may include bleeding or severe ear pain.  Date Last Reviewed: 5/1/2017 2000-2019 [employer]. [address] [place] [address], [place] [address]. All rights reserved. This information is not intended as a substitute for professional medical care. Always follow your healthcare professional's instructions.               Return in about 1 week (around 7/1/2020) for ENT.    Glenn Vagn MD  [facility]       risks/benefits discussed with patient

## 2021-12-30 ENCOUNTER — OFFICE VISIT (OUTPATIENT)
Dept: FAMILY MEDICINE | Facility: CLINIC | Age: 21
End: 2021-12-30
Payer: COMMERCIAL

## 2021-12-30 VITALS
DIASTOLIC BLOOD PRESSURE: 66 MMHG | BODY MASS INDEX: 26.06 KG/M2 | HEART RATE: 67 BPM | SYSTOLIC BLOOD PRESSURE: 110 MMHG | OXYGEN SATURATION: 99 % | WEIGHT: 171.4 LBS

## 2021-12-30 DIAGNOSIS — R21 RASH: ICD-10-CM

## 2021-12-30 DIAGNOSIS — T78.40XA ALLERGIC REACTION, INITIAL ENCOUNTER: Primary | ICD-10-CM

## 2021-12-30 PROCEDURE — 99213 OFFICE O/P EST LOW 20 MIN: CPT | Performed by: FAMILY MEDICINE

## 2021-12-30 RX ORDER — TRIAMCINOLONE ACETONIDE 1 MG/G
CREAM TOPICAL 3 TIMES DAILY
Qty: 45 G | Refills: 0 | Status: SHIPPED | OUTPATIENT
Start: 2021-12-30

## 2021-12-30 NOTE — PROGRESS NOTES
Marcell was seen today for derm problem.    Diagnoses and all orders for this visit:    Allergic reaction, initial encounter  -     triamcinolone (KENALOG) 0.1 % external cream; Apply topically 3 times daily Apply to rash on left arm three times daily as needed    Rash    I think this is allergic reaction to adhesive and this will resolve with time.  He can use steroid cream PRN for itching.      SUBJECTIVE: Marcell Ochoa is a 21 year old male who presents with the following:  Patient presents with:  Derm Problem: Rash on arm since Monday 12/20/21 after getting tattoo on arm  He got tattoo 1 1/2 weeks ago on his left arm.  The tattoo was covered with an adhesive which he never had before.  He then started with a papular rash in the area of the adhesive.  Rash is itchy.  There are some open areas with serous discharge.  He had tattoo on his chest in past with no issues.  He has removed the adhesive covering.  Works as a  and keeps his arm covered at work.    OBJECTIVE: /66 (BP Location: Left arm, Patient Position: Sitting, Cuff Size: Adult Regular)   Pulse 67   Wt 77.7 kg (171 lb 6.4 oz)   SpO2 99%   BMI 26.06 kg/m   no distress  Left arm:  Erythematous papular rash over arm with some open areas.        Mindy Valdez MD

## 2022-01-25 NOTE — PROGRESS NOTES
Chief Complaint   Patient presents with     Cerumen Impaction     History of Present Illness   Marcell Ochoa is a 21 year old male who presents to me today for ear follow-up.  I last evaluated patient on July 15, 2020 for cerumen removal.  He has had his ears cleaned out before. The patient denies significant hearing changes, otalgia, otorrhea, bloody otorrhea, dizziness, tinnitus, vertigo.  No history of ear surgery or chronic ear disease.     Past Medical History  There is no problem list on file for this patient.    Current Medications    Current Outpatient Medications:      triamcinolone (KENALOG) 0.1 % external cream, Apply topically 3 times daily Apply to rash on left arm three times daily as needed, Disp: 45 g, Rfl: 0    Allergies  No Known Allergies    Social History  Social History     Socioeconomic History     Marital status: Single     Spouse name: Not on file     Number of children: 0     Years of education: 7th     Highest education level: Not on file   Occupational History     Employer: [employer]   Tobacco Use     Smoking status: Never Smoker     Smokeless tobacco: Never Used   Substance and Sexual Activity     Alcohol use: Yes     Comment: little     Drug use: No     Sexual activity: Never   Other Topics Concern     Not on file   Social History Narrative     Not on file     Social Determinants of Health     Financial Resource Strain: Not on file   Food Insecurity: Not on file   Transportation Needs: Not on file   Physical Activity: Not on file   Stress: Not on file   Social Connections: Not on file   Intimate Partner Violence: Not on file   Housing Stability: Not on file       Family History  Family History   Problem Relation Age of Onset     Eye Disorder Maternal Grandmother         glaucoma     Respiratory Maternal Grandmother         emphycema     Diabetes Maternal Grandfather      Hypertension Maternal Grandfather      Arthritis Maternal Grandfather      Eye Disorder Maternal Grandfather          "cataracts/optic nerve stroke     Heart Disease Maternal Grandfather         stints put in     Respiratory Maternal Grandfather         sleep apnea     Thyroid Disease Paternal Grandmother      Neurologic Disorder Paternal Grandfather         lymes disease     Neurologic Disorder Other         down syndrome     Diabetes Other        Review of Systems  As per HPI and PMHx, otherwise 7 system review of the head and neck negative.    Physical Exam  /73 (BP Location: Right arm, Patient Position: Sitting, Cuff Size: Adult Regular)   Pulse 78   Temp (!) 96.1  F (35.6  C) (Tympanic)   Ht 1.727 m (5' 8\")   Wt 77.6 kg (171 lb)   BMI 26.00 kg/m    GENERAL: Patient is a pleasant, cooperative 21 year old male in no acute distress.  HEAD: Normocephalic, atraumatic.  Hair and scalp are normal.  EYES: Pupils are equal, round, reactive to light and accommodation.  Extraocular movements are intact.  The sclera nonicteric without injection.  The extraocular structures are normal.  EARS: See below.  NEUROLOGIC: Cranial nerves II through XII are grossly intact.  Voice is strong.  Facial nerve examination incomplete due to the patient wearing a mask.  CARDIOVASCULAR: Extremities are warm and well-perfused.  No significant peripheral edema.  RESPIRATORY: Patient has nonlabored breathing without cough, wheeze, stridor.  PSYCHIATRIC: Patient is alert and oriented.  Mood and affect appear normal.  SKIN: Warm and dry.  No scalp, face, or neck lesions noted.    Physical Exam and Procedure    EARS: Normal shape and symmetry.  No tenderness when palpating the mastoid or tragal areas bilaterally.  The ears were then examined under the otic binocular microscope to perform cerumen removal.  Otoscopic exam on the right reveals impacted cerumen down to the level of the tympanic membrane.  The cerumen was removed with a curette and alligator forceps.  The right tympanic membrane was round, intact without evidence of effusion.  No " retraction, granulation, drainage, or evidence of cholesteatoma.      Attention was turned to the left ear.  Otoscopic exam on the left reveals impacted cerumen down to the level of the tympanic membrane.  The cerumen was removed with a curette and alligator forceps.  The left tympanic membrane was round, intact without evidence of effusion.  No retraction, granulation, drainage, or evidence of cholesteatoma.      Assessment and Plan     ICD-10-CM    1. Bilateral impacted cerumen  H61.23 Remove Cerumen     It was my pleasure seeing Marcell Ochoa today in clinic.  The patient had bilateral cerumen impactions today successfully removed in office.  We spent the remainder of today's visit on education including not putting any instrument in the ear.  The patient can use over-the-counter items such as Debrox or Ceruminex.     The patient will follow up in 1 year for routine ear cleaning or sooner if symptoms warrant.    Elan Chandler MD  Department of Otolaryngology-Head and Neck Surgery  [facility]

## 2022-01-26 ENCOUNTER — OFFICE VISIT (OUTPATIENT)
Dept: OTOLARYNGOLOGY | Facility: CLINIC | Age: 22
End: 2022-01-26
Payer: COMMERCIAL

## 2022-01-26 VITALS
HEART RATE: 78 BPM | DIASTOLIC BLOOD PRESSURE: 73 MMHG | SYSTOLIC BLOOD PRESSURE: 130 MMHG | TEMPERATURE: 96.1 F | HEIGHT: 68 IN | WEIGHT: 171 LBS | BODY MASS INDEX: 25.91 KG/M2

## 2022-01-26 DIAGNOSIS — H61.23 BILATERAL IMPACTED CERUMEN: Primary | ICD-10-CM

## 2022-01-26 PROCEDURE — 69210 REMOVE IMPACTED EAR WAX UNI: CPT | Performed by: OTOLARYNGOLOGY

## 2022-01-26 PROCEDURE — 99207 PR DROP WITH A PROCEDURE: CPT | Performed by: OTOLARYNGOLOGY

## 2022-01-26 ASSESSMENT — PAIN SCALES - GENERAL: PAINLEVEL: NO PAIN (0)

## 2022-01-26 ASSESSMENT — MIFFLIN-ST. JEOR: SCORE: 1750.15

## 2022-01-26 NOTE — NURSING NOTE
"Initial /73 (BP Location: Right arm, Patient Position: Sitting, Cuff Size: Adult Regular)   Pulse 78   Temp (!) 96.1  F (35.6  C) (Tympanic)   Ht 1.727 m (5' 8\")   Wt 77.6 kg (171 lb)   BMI 26.00 kg/m   Estimated body mass index is 26 kg/m  as calculated from the following:    Height as of this encounter: 1.727 m (5' 8\").    Weight as of this encounter: 77.6 kg (171 lb). .    Alba Luther LPN on 1/26/2022 at 8:45 AM    "

## 2022-01-26 NOTE — LETTER
1/26/2022         RE: Marcell Ochoa  [id number]  [place] [facility] [place] [facility] [id number]        Dear Colleague,    Thank you for referring your patient, Marcell Ochoa, to the [facility]. Please see a copy of my visit note below.    Chief Complaint   Patient presents with     Cerumen Impaction     History of Present Illness   Marcell Ochoa is a 21 year old male who presents to me today for ear follow-up.  I last evaluated patient on July 15, 2020 for cerumen removal.  He has had his ears cleaned out before. The patient denies significant hearing changes, otalgia, otorrhea, bloody otorrhea, dizziness, tinnitus, vertigo.  No history of ear surgery or chronic ear disease.     Past Medical History  There is no problem list on file for this patient.    Current Medications    Current Outpatient Medications:      triamcinolone (KENALOG) 0.1 % external cream, Apply topically 3 times daily Apply to rash on left arm three times daily as needed, Disp: 45 g, Rfl: 0    Allergies  No Known Allergies    Social History  Social History     Socioeconomic History     Marital status: Single     Spouse name: Not on file     Number of children: 0     Years of education: 7th     Highest education level: Not on file   Occupational History     Employer: [employer]   Tobacco Use     Smoking status: Never Smoker     Smokeless tobacco: Never Used   Substance and Sexual Activity     Alcohol use: Yes     Comment: little     Drug use: No     Sexual activity: Never   Other Topics Concern     Not on file   Social History Narrative     Not on file     Social Determinants of Health     Financial Resource Strain: Not on file   Food Insecurity: Not on file   Transportation Needs: Not on file   Physical Activity: Not on file   Stress: Not on file   Social Connections: Not on file   Intimate Partner Violence: Not on file   Housing Stability: Not on file       Family History  Family History   Problem Relation Age of Onset     Eye  "Disorder Maternal Grandmother         glaucoma     Respiratory Maternal Grandmother         emphycema     Diabetes Maternal Grandfather      Hypertension Maternal Grandfather      Arthritis Maternal Grandfather      Eye Disorder Maternal Grandfather         cataracts/optic nerve stroke     Heart Disease Maternal Grandfather         stints put in     Respiratory Maternal Grandfather         sleep apnea     Thyroid Disease Paternal Grandmother      Neurologic Disorder Paternal Grandfather         lymes disease     Neurologic Disorder Other         down syndrome     Diabetes Other        Review of Systems  As per HPI and PMHx, otherwise 7 system review of the head and neck negative.    Physical Exam  /73 (BP Location: Right arm, Patient Position: Sitting, Cuff Size: Adult Regular)   Pulse 78   Temp (!) 96.1  F (35.6  C) (Tympanic)   Ht 1.727 m (5' 8\")   Wt 77.6 kg (171 lb)   BMI 26.00 kg/m    GENERAL: Patient is a pleasant, cooperative 21 year old male in no acute distress.  HEAD: Normocephalic, atraumatic.  Hair and scalp are normal.  EYES: Pupils are equal, round, reactive to light and accommodation.  Extraocular movements are intact.  The sclera nonicteric without injection.  The extraocular structures are normal.  EARS: See below.  NEUROLOGIC: Cranial nerves II through XII are grossly intact.  Voice is strong.  Facial nerve examination incomplete due to the patient wearing a mask.  CARDIOVASCULAR: Extremities are warm and well-perfused.  No significant peripheral edema.  RESPIRATORY: Patient has nonlabored breathing without cough, wheeze, stridor.  PSYCHIATRIC: Patient is alert and oriented.  Mood and affect appear normal.  SKIN: Warm and dry.  No scalp, face, or neck lesions noted.    Physical Exam and Procedure    EARS: Normal shape and symmetry.  No tenderness when palpating the mastoid or tragal areas bilaterally.  The ears were then examined under the otic binocular microscope to perform cerumen " removal.  Otoscopic exam on the right reveals impacted cerumen down to the level of the tympanic membrane.  The cerumen was removed with a curette and alligator forceps.  The right tympanic membrane was round, intact without evidence of effusion.  No retraction, granulation, drainage, or evidence of cholesteatoma.      Attention was turned to the left ear.  Otoscopic exam on the left reveals impacted cerumen down to the level of the tympanic membrane.  The cerumen was removed with a curette and alligator forceps.  The left tympanic membrane was round, intact without evidence of effusion.  No retraction, granulation, drainage, or evidence of cholesteatoma.      Assessment and Plan     ICD-10-CM    1. Bilateral impacted cerumen  H61.23 Remove Cerumen     It was my pleasure seeing Marcell Ochoa today in clinic.  The patient had bilateral cerumen impactions today successfully removed in office.  We spent the remainder of today's visit on education including not putting any instrument in the ear.  The patient can use over-the-counter items such as Debrox or Ceruminex.     The patient will follow up in 1 year for routine ear cleaning or sooner if symptoms warrant.    Elan Chandler MD  Department of Otolaryngology-Head and Neck Surgery  [facility]         Again, thank you for allowing me to participate in the care of your patient.        Sincerely,        Elan Chandler MD

## 2022-01-29 ENCOUNTER — HOSPITAL ENCOUNTER (EMERGENCY)
Facility: CLINIC | Age: 22
Discharge: HOME | End: 2022-01-29
Attending: EMERGENCY MEDICINE | Admitting: EMERGENCY MEDICINE
Payer: COMMERCIAL

## 2022-01-29 VITALS
WEIGHT: 170 LBS | DIASTOLIC BLOOD PRESSURE: 75 MMHG | SYSTOLIC BLOOD PRESSURE: 133 MMHG | RESPIRATION RATE: 16 BRPM | BODY MASS INDEX: 25.85 KG/M2 | HEART RATE: 59 BPM | TEMPERATURE: 97.8 F | OXYGEN SATURATION: 98 %

## 2022-01-29 DIAGNOSIS — K80.50 BILIARY COLIC: ICD-10-CM

## 2022-01-29 LAB
ALBUMIN SERPL-MCNC: 4.1 G/DL (ref 3.4–5)
ALP SERPL-CCNC: 68 U/L (ref 40–150)
ALT SERPL W P-5'-P-CCNC: 23 U/L (ref 0–70)
ANION GAP SERPL CALCULATED.3IONS-SCNC: 4 MMOL/L (ref 3–14)
AST SERPL W P-5'-P-CCNC: 21 U/L (ref 0–45)
BASOPHILS # BLD AUTO: 0 10E3/UL (ref 0–0.2)
BASOPHILS NFR BLD AUTO: 0 %
BILIRUB SERPL-MCNC: 0.3 MG/DL (ref 0.2–1.3)
BUN SERPL-MCNC: 18 MG/DL (ref 7–30)
CALCIUM SERPL-MCNC: 8.6 MG/DL (ref 8.5–10.1)
CHLORIDE BLD-SCNC: 107 MMOL/L (ref 94–109)
CO2 SERPL-SCNC: 28 MMOL/L (ref 20–32)
CREAT SERPL-MCNC: 1.11 MG/DL (ref 0.66–1.25)
EOSINOPHIL # BLD AUTO: 0.1 10E3/UL (ref 0–0.7)
EOSINOPHIL NFR BLD AUTO: 2 %
ERYTHROCYTE [DISTWIDTH] IN BLOOD BY AUTOMATED COUNT: 12.4 % (ref 10–15)
GFR SERPL CREATININE-BSD FRML MDRD: >90 ML/MIN/1.73M2
GLUCOSE BLD-MCNC: 99 MG/DL (ref 70–99)
HCT VFR BLD AUTO: 42.8 % (ref 40–53)
HGB BLD-MCNC: 15.1 G/DL (ref 13.3–17.7)
IMM GRANULOCYTES # BLD: 0 10E3/UL
IMM GRANULOCYTES NFR BLD: 0 %
LIPASE SERPL-CCNC: 56 U/L (ref 73–393)
LYMPHOCYTES # BLD AUTO: 2 10E3/UL (ref 0.8–5.3)
LYMPHOCYTES NFR BLD AUTO: 32 %
MCH RBC QN AUTO: 32 PG (ref 26.5–33)
MCHC RBC AUTO-ENTMCNC: 35.3 G/DL (ref 31.5–36.5)
MCV RBC AUTO: 91 FL (ref 78–100)
MONOCYTES # BLD AUTO: 0.5 10E3/UL (ref 0–1.3)
MONOCYTES NFR BLD AUTO: 8 %
NEUTROPHILS # BLD AUTO: 3.5 10E3/UL (ref 1.6–8.3)
NEUTROPHILS NFR BLD AUTO: 58 %
NRBC # BLD AUTO: 0 10E3/UL
NRBC BLD AUTO-RTO: 0 /100
PLATELET # BLD AUTO: 199 10E3/UL (ref 150–450)
POTASSIUM BLD-SCNC: 3.8 MMOL/L (ref 3.4–5.3)
PROT SERPL-MCNC: 7 G/DL (ref 6.8–8.8)
RBC # BLD AUTO: 4.72 10E6/UL (ref 4.4–5.9)
SODIUM SERPL-SCNC: 139 MMOL/L (ref 133–144)
WBC # BLD AUTO: 6.2 10E3/UL (ref 4–11)

## 2022-01-29 PROCEDURE — 85025 COMPLETE CBC W/AUTO DIFF WBC: CPT | Performed by: EMERGENCY MEDICINE

## 2022-01-29 PROCEDURE — 36415 COLL VENOUS BLD VENIPUNCTURE: CPT | Performed by: EMERGENCY MEDICINE

## 2022-01-29 PROCEDURE — 83690 ASSAY OF LIPASE: CPT | Performed by: EMERGENCY MEDICINE

## 2022-01-29 PROCEDURE — 96374 THER/PROPH/DIAG INJ IV PUSH: CPT | Performed by: EMERGENCY MEDICINE

## 2022-01-29 PROCEDURE — 76705 ECHO EXAM OF ABDOMEN: CPT | Mod: 26 | Performed by: EMERGENCY MEDICINE

## 2022-01-29 PROCEDURE — 99285 EMERGENCY DEPT VISIT HI MDM: CPT | Mod: 25 | Performed by: EMERGENCY MEDICINE

## 2022-01-29 PROCEDURE — 250N000011 HC RX IP 250 OP 636: Performed by: EMERGENCY MEDICINE

## 2022-01-29 PROCEDURE — 80053 COMPREHEN METABOLIC PANEL: CPT | Performed by: EMERGENCY MEDICINE

## 2022-01-29 PROCEDURE — 250N000013 HC RX MED GY IP 250 OP 250 PS 637: Performed by: EMERGENCY MEDICINE

## 2022-01-29 PROCEDURE — 76705 ECHO EXAM OF ABDOMEN: CPT | Performed by: EMERGENCY MEDICINE

## 2022-01-29 RX ORDER — ONDANSETRON 4 MG/1
4 TABLET, ORALLY DISINTEGRATING ORAL ONCE
Status: COMPLETED | OUTPATIENT
Start: 2022-01-29 | End: 2022-01-29

## 2022-01-29 RX ORDER — HYDROMORPHONE HYDROCHLORIDE 1 MG/ML
0.5 INJECTION, SOLUTION INTRAMUSCULAR; INTRAVENOUS; SUBCUTANEOUS
Status: DISCONTINUED | OUTPATIENT
Start: 2022-01-29 | End: 2022-01-29 | Stop reason: HOSPADM

## 2022-01-29 RX ORDER — FAMOTIDINE 20 MG/1
40 TABLET, FILM COATED ORAL ONCE
Status: COMPLETED | OUTPATIENT
Start: 2022-01-29 | End: 2022-01-29

## 2022-01-29 RX ORDER — MAGNESIUM HYDROXIDE/ALUMINUM HYDROXICE/SIMETHICONE 120; 1200; 1200 MG/30ML; MG/30ML; MG/30ML
30 SUSPENSION ORAL ONCE
Status: COMPLETED | OUTPATIENT
Start: 2022-01-29 | End: 2022-01-29

## 2022-01-29 RX ORDER — OXYCODONE HYDROCHLORIDE 5 MG/1
5 TABLET ORAL EVERY 6 HOURS PRN
Qty: 6 TABLET | Refills: 0 | Status: SHIPPED | OUTPATIENT
Start: 2022-01-29

## 2022-01-29 RX ADMIN — ONDANSETRON 4 MG: 4 TABLET, ORALLY DISINTEGRATING ORAL at 02:32

## 2022-01-29 RX ADMIN — ALUMINUM HYDROXIDE, MAGNESIUM HYDROXIDE, AND SIMETHICONE 30 ML: 200; 200; 20 SUSPENSION ORAL at 02:27

## 2022-01-29 RX ADMIN — HYDROMORPHONE HYDROCHLORIDE 0.5 MG: 1 INJECTION, SOLUTION INTRAMUSCULAR; INTRAVENOUS; SUBCUTANEOUS at 03:10

## 2022-01-29 RX ADMIN — FAMOTIDINE 40 MG: 20 TABLET, FILM COATED ORAL at 02:27

## 2022-01-29 NOTE — ED PROVIDER NOTES
History     Chief Complaint   Patient presents with     Abdominal Pain     HPI  Marcell Ochoa is a 21 year old male who presents for right upper quadrant abdominal pain.  Symptoms started this evening after having a large brisket dinner.  Pain feels like gas and bloating underneath his ribs, radiates to his back.  He has some mild nausea but no vomiting.  Normal bowel movement just prior to arrival this morning.  He denies any fevers, chills, cough, chest pain, dysuria, urinary frequency, or rash.  No prior abdominal surgeries.  He was seen here about 2 years ago with some similar pain and diagnosed with likely biliary colic at the time.    Allergies:  No Known Allergies    Problem List:    There are no problems to display for this patient.       Past Medical History:    Past Medical History:   Diagnosis Date     NO ACTIVE PROBLEMS        Past Surgical History:    Past Surgical History:   Procedure Laterality Date     NO HISTORY OF SURGERY         Family History:    Family History   Problem Relation Age of Onset     Eye Disorder Maternal Grandmother         glaucoma     Respiratory Maternal Grandmother         emphycema     Diabetes Maternal Grandfather      Hypertension Maternal Grandfather      Arthritis Maternal Grandfather      Eye Disorder Maternal Grandfather         cataracts/optic nerve stroke     Heart Disease Maternal Grandfather         stints put in     Respiratory Maternal Grandfather         sleep apnea     Thyroid Disease Paternal Grandmother      Neurologic Disorder Paternal Grandfather         lymes disease     Neurologic Disorder Other         down syndrome     Diabetes Other        Social History:  Marital Status:  Single [1]  Social History     Tobacco Use     Smoking status: Never Smoker     Smokeless tobacco: Never Used   Substance Use Topics     Alcohol use: Yes     Comment: little     Drug use: No        Medications:    oxyCODONE (ROXICODONE) 5 MG tablet  triamcinolone (KENALOG) 0.1 %  external cream          Review of Systems  Pertinent positives and negatives listed in the HPI, all other systems reviewed and are negative.    Physical Exam   BP: 129/71  Pulse: 63  Temp: 97.8  F (36.6  C)  Resp: 16  Weight: 77.1 kg (170 lb)  SpO2: 100 %      Physical Exam  Vitals and nursing note reviewed.   Constitutional:       General: He is in acute distress.      Appearance: He is well-developed. He is not diaphoretic.   HENT:      Head: Normocephalic and atraumatic.      Right Ear: External ear normal.      Left Ear: External ear normal.      Nose: Nose normal.   Eyes:      General: No scleral icterus.     Conjunctiva/sclera: Conjunctivae normal.   Cardiovascular:      Rate and Rhythm: Normal rate and regular rhythm.   Pulmonary:      Effort: Pulmonary effort is normal. No respiratory distress.      Breath sounds: No stridor.   Abdominal:      General: There is no distension.      Palpations: Abdomen is soft.      Tenderness: There is abdominal tenderness in the right upper quadrant. There is no guarding or rebound.   Musculoskeletal:      Cervical back: Normal range of motion.   Skin:     General: Skin is warm and dry.   Neurological:      Mental Status: He is alert and oriented to person, place, and time.   Psychiatric:         Behavior: Behavior normal.         ED Course                 Procedures    Results for orders placed during the hospital encounter of 01/29/22    POC US ABDOMEN LIMITED    [facility] Procedure Note    Limited Bedside ED Gallbladder  Ultrasound:    PROCEDURE: PERFORMED BY: Dr. Ezra Gilbert MD  INDICATIONS:  RUQ/Epigastric Pain  PROBE:  Low frequency convex probe  BODY LOCATION: Abdomen  FINDINGS:   An ultrasound of the gallbladder was performed using longitudinal and transverse views.  Gallstone(s):  Absent  Gallbladder sludge:  Absent  Sonographic Rowe's sign:  Absent  Gallbladder wall thickening (greater than 4 mm):  Absent  Pericholecystic fluid:  Absent  Common bile duct (dilated if internal diameter greater than 6 mm): 3 mm  INTERPRETATION:  The gallbladder evaluation is normal with no gallstones/sludge, no sonographic Rowe's sign, no GB wall thickening, no pericholecystic fluid, and without evidence of cholelithiasis or cholecystitis.  IMAGE DOCUMENTATION: Images were archived to PACs system.            Critical Care time:  none               Results for orders placed or performed during the hospital encounter of 01/29/22 (from the past 24 hour(s))   POC US ABDOMEN LIMITED    [facility] Procedure Note      Limited Bedside ED Gallbladder  Ultrasound:    PROCEDURE: PERFORMED BY: Dr. Ezra Gilbert MD  INDICATIONS:  RUQ/Epigastric Pain  PROBE:  Low frequency convex probe  BODY LOCATION: Abdomen  FINDINGS:   An ultrasound of the gallbladder was performed using longitudinal and transverse views.  Gallstone(s):  Absent  Gallbladder sludge:  Absent  Sonographic Rowe's sign:  Absent  Gallbladder wall thickening (greater than 4 mm):  Absent  Pericholecystic fluid: Absent  Common bile duct (dilated if internal diameter greater than 6 mm): 3 mm   INTERPRETATION:  The gallbladder evaluation is normal with no gallstones/sludge, no sonographic Rowe's sign, no GB wall thickening, no pericholecystic fluid, and without evidence of cholelithiasis or cholecystitis.  IMAGE DOCUMENTATION: Images were archived to PACs system.    CBC with Platelets & Differential    Narrative    The following orders were created for panel order CBC with Platelets & Differential.  Procedure                               Abnormality         Status                     ---------                               -----------         ------                     CBC with platelets and d...[[id number]]                      Final result                 Please view results for these tests on the individual orders.   Comprehensive metabolic panel   Result Value Ref Range     Sodium 139 133 - 144 mmol/L    Potassium 3.8 3.4 - 5.3 mmol/L    Chloride 107 94 - 109 mmol/L    Carbon Dioxide (CO2) 28 20 - 32 mmol/L    Anion Gap 4 3 - 14 mmol/L    Urea Nitrogen 18 7 - 30 mg/dL    Creatinine 1.11 0.66 - 1.25 mg/dL    Calcium 8.6 8.5 - 10.1 mg/dL    Glucose 99 70 - 99 mg/dL    Alkaline Phosphatase 68 40 - 150 U/L    AST 21 0 - 45 U/L    ALT 23 0 - 70 U/L    Protein Total 7.0 6.8 - 8.8 g/dL    Albumin 4.1 3.4 - 5.0 g/dL    Bilirubin Total 0.3 0.2 - 1.3 mg/dL    GFR Estimate >90 >60 mL/min/1.73m2   Lipase   Result Value Ref Range    Lipase 56 (L) 73 - 393 U/L   CBC with platelets and differential   Result Value Ref Range    WBC Count 6.2 4.0 - 11.0 10e3/uL    RBC Count 4.72 4.40 - 5.90 10e6/uL    Hemoglobin 15.1 13.3 - 17.7 g/dL    Hematocrit 42.8 40.0 - 53.0 %    MCV 91 78 - 100 fL    MCH 32.0 26.5 - 33.0 pg    MCHC 35.3 31.5 - 36.5 g/dL    RDW 12.4 10.0 - 15.0 %    Platelet Count 199 150 - 450 10e3/uL    % Neutrophils 58 %    % Lymphocytes 32 %    % Monocytes 8 %    % Eosinophils 2 %    % Basophils 0 %    % Immature Granulocytes 0 %    NRBCs per 100 WBC 0 <1 /100    Absolute Neutrophils 3.5 1.6 - 8.3 10e3/uL    Absolute Lymphocytes 2.0 0.8 - 5.3 10e3/uL    Absolute Monocytes 0.5 0.0 - 1.3 10e3/uL    Absolute Eosinophils 0.1 0.0 - 0.7 10e3/uL    Absolute Basophils 0.0 0.0 - 0.2 10e3/uL    Absolute Immature Granulocytes 0.0 <=0.4 10e3/uL    Absolute NRBCs 0.0 10e3/uL       Medications   HYDROmorphone (PF) (DILAUDID) injection 0.5 mg (0.5 mg Intravenous Given 1/29/22 0310)   alum & mag hydroxide-simethicone (MAALOX) suspension 30 mL (30 mLs Oral Given 1/29/22 0227)   famotidine (PEPCID) tablet 40 mg (40 mg Oral Given 1/29/22 0227)   ondansetron (ZOFRAN-ODT) ODT tab 4 mg (4 mg Oral Given 1/29/22 0232)       Assessments & Plan (with Medical Decision Making)   21-year-old male presents with right upper quadrant abdominal pain and nausea after having a large brisket dinner.  Blood pressure is 129/71,  temperature is 36.6  C, heart rate 63, SPO2 is 100% on room air.  Bedside ultrasound is negative for signs of cholelithiasis or cholecystitis.  He is given ondansetron, Maalox, and famotidine for symptoms.  This did not help his symptoms and he was given IV hydromorphone with significant improvement.  White blood cell count is 6.2 which is reassuring.  Hemoglobin is 15.1, no signs of anemia.  Electrolytes are within normal limits.  LFTs are normal, no signs of hepatitis.  Lipase is normal, no signs of pancreatitis.  He is tolerating oral intake now and pain is significantly improved.  We discussed the possibility of CT of the abdomen pelvis at this time, however I think this is not can provide any additional insight for this patient as I do not believe that he has an acute surgical process at this time.  No signs of obstruction on exam.  His pain is very far from where his appendix would be and with the reassuring blood work and with his history very typical of biliary colic with the pain coming on after a large fatty meal like this is likely the cause of his symptoms tonight as well.  At this point he is safe to discharge with a short course of oxycodone and instructions to return if he has worsening of symptoms or other concerns, otherwise follow-up in surgery clinic and avoid fatty meals.  The patient is in agreement with this plan.    I have reviewed the nursing notes.    I have reviewed the findings, diagnosis, plan and need for follow up with the patient.       New Prescriptions    OXYCODONE (ROXICODONE) 5 MG TABLET    Take 1 tablet (5 mg) by mouth every 6 hours as needed for severe pain       Final diagnoses:   Biliary colic       1/29/2022   [facility] EMERGENCY DEPT     Ezra Gilbert MD  01/29/22 [id number]

## 2022-01-29 NOTE — ED TRIAGE NOTES
Gallbladder issue.  Last seen 3 yrs ago, same pain, under [facility].  Pt ate dinner, a brisket sandwich, at 8pm, and pain started at 11pm.  Unable to sleep.  Pain worse with laying down.  He has not taken any medication for the pain.  Mild nausea, no vomiting, BM this evening.

## 2022-01-29 NOTE — DISCHARGE INSTRUCTIONS
Return to the emergency department for fevers, repeated vomiting, worsening pain, or other concerns.  Otherwise follow-up in surgery clinic for recheck.    Use ibuprofen 400 mg and acetaminophen 650 mg every 6 hours as needed for your symptoms.  Use oxycodone as needed for pain that is not controlled by the prior two medications.    Oxycodone is an addictive opioid medication, please only take it when the pain is more than can be controlled by acetaminophen or ibuprofen alone. It will also make you lightheaded, nauseated, and constipated.  Do not drive, operate heavy machinery, or take care of young children while taking this medication. Do not mix it with other medications or drugs that will make you sleepy, such as alcohol.     Repeated studies have shown that the longer you use opioid pain medications, the longer it is until you return to normal function. It is our recommendation that you taper off opioids as quickly as possible with the goal of returning to normal function or near normal function. Long term use of opioids quickly results in growing tolerance to the medication (less of the benefits) and increased dependence (more of the bad side effects).     Pain is very difficult to treat and we can very rarely take away pain completely. If you are having difficulty with pain over several weeks after an injury, you may need to start different medications and therapies (such as physical therapy, graded exercise, massage, and acupuncture). Please talk about this with your regular doctor.     If you are interested in seeking free, confidential treatment referral and information service for individuals and families facing mental health and/or substance use disorders please call [phone] ([phone])

## 2022-01-29 NOTE — ED NOTES
18g IV started in R FA, labs drawn and sent.  Pt medicated per orders.  MD performed US at bedside.    PLAN: Will await lab results.

## 2022-06-26 ENCOUNTER — HOSPITAL ENCOUNTER (EMERGENCY)
Facility: CLINIC | Age: 22
Discharge: HOME | End: 2022-06-26
Attending: NURSE PRACTITIONER | Admitting: NURSE PRACTITIONER
Payer: COMMERCIAL

## 2022-06-26 VITALS
OXYGEN SATURATION: 100 % | SYSTOLIC BLOOD PRESSURE: 110 MMHG | DIASTOLIC BLOOD PRESSURE: 70 MMHG | TEMPERATURE: 97.3 F | BODY MASS INDEX: 25.76 KG/M2 | HEART RATE: 79 BPM | RESPIRATION RATE: 16 BRPM | HEIGHT: 68 IN | WEIGHT: 170 LBS

## 2022-06-26 DIAGNOSIS — S61.213A LACERATION OF LEFT MIDDLE FINGER W/O FOREIGN BODY W/O DAMAGE TO NAIL, INITIAL ENCOUNTER: ICD-10-CM

## 2022-06-26 PROCEDURE — 90715 TDAP VACCINE 7 YRS/> IM: CPT | Performed by: NURSE PRACTITIONER

## 2022-06-26 PROCEDURE — 90471 IMMUNIZATION ADMIN: CPT | Performed by: NURSE PRACTITIONER

## 2022-06-26 PROCEDURE — G0463 HOSPITAL OUTPT CLINIC VISIT: HCPCS | Mod: 25 | Performed by: NURSE PRACTITIONER

## 2022-06-26 PROCEDURE — 12001 RPR S/N/AX/GEN/TRNK 2.5CM/<: CPT | Performed by: NURSE PRACTITIONER

## 2022-06-26 PROCEDURE — 99213 OFFICE O/P EST LOW 20 MIN: CPT | Mod: 25 | Performed by: NURSE PRACTITIONER

## 2022-06-26 PROCEDURE — 250N000011 HC RX IP 250 OP 636: Performed by: NURSE PRACTITIONER

## 2022-06-26 RX ADMIN — CLOSTRIDIUM TETANI TOXOID ANTIGEN (FORMALDEHYDE INACTIVATED), CORYNEBACTERIUM DIPHTHERIAE TOXOID ANTIGEN (FORMALDEHYDE INACTIVATED), BORDETELLA PERTUSSIS TOXOID ANTIGEN (GLUTARALDEHYDE INACTIVATED), BORDETELLA PERTUSSIS FILAMENTOUS HEMAGGLUTININ ANTIGEN (FORMALDEHYDE INACTIVATED), BORDETELLA PERTUSSIS PERTACTIN ANTIGEN, AND BORDETELLA PERTUSSIS FIMBRIAE 2/3 ANTIGEN 0.5 ML: 5; 2; 2.5; 5; 3; 5 INJECTION, SUSPENSION INTRAMUSCULAR at 17:43

## 2022-06-26 ASSESSMENT — ENCOUNTER SYMPTOMS
CONSTIPATION: 0
ABDOMINAL PAIN: 0
NUMBNESS: 0
SHORTNESS OF BREATH: 0
LIGHT-HEADEDNESS: 0
EYE ITCHING: 0
COUGH: 0
FEVER: 0
BACK PAIN: 0
FATIGUE: 0
EYE DISCHARGE: 0
VOMITING: 0
WOUND: 1
APPETITE CHANGE: 0
DIZZINESS: 0
DIFFICULTY URINATING: 0
CHILLS: 0
DIARRHEA: 0
SORE THROAT: 0

## 2022-06-26 NOTE — DISCHARGE INSTRUCTIONS
Leave splint on during the day to encourage healing.  Leave bandage on until tomorrow morning and then leave open to air.  You may wash the wound to keep it clean and dry.  You may apply a small amount of bacitracin as needed to prevent the stitches from adhering.  Return if signs of infection.

## 2022-06-26 NOTE — ED PROVIDER NOTES
History     Chief Complaint   Patient presents with     Laceration     HPI  Marcell Ochoa is a 21 year old male who presents to urgent care for evaluation of left middle finger laceration. He states the incident happened about an hour ago when he was trimming branches with a small electric chainsaw. He states the saw kicked back and lacerated his finger. According to his mother, he is due for his Tetanus booster this year.    Allergies:  No Known Allergies    Problem List:    There are no problems to display for this patient.       Past Medical History:    Past Medical History:   Diagnosis Date     NO ACTIVE PROBLEMS        Past Surgical History:    Past Surgical History:   Procedure Laterality Date     NO HISTORY OF SURGERY         Family History:    Family History   Problem Relation Age of Onset     Eye Disorder Maternal Grandmother         glaucoma     Respiratory Maternal Grandmother         emphycema     Diabetes Maternal Grandfather      Hypertension Maternal Grandfather      Arthritis Maternal Grandfather      Eye Disorder Maternal Grandfather         cataracts/optic nerve stroke     Heart Disease Maternal Grandfather         stints put in     Respiratory Maternal Grandfather         sleep apnea     Thyroid Disease Paternal Grandmother      Neurologic Disorder Paternal Grandfather         lymes disease     Neurologic Disorder Other         down syndrome     Diabetes Other        Social History:  Marital Status:  Single [1]  Social History     Tobacco Use     Smoking status: Never Smoker     Smokeless tobacco: Never Used   Substance Use Topics     Alcohol use: Yes     Comment: little     Drug use: No        Medications:    oxyCODONE (ROXICODONE) 5 MG tablet  triamcinolone (KENALOG) 0.1 % external cream          Review of Systems   Constitutional: Negative for appetite change, chills, fatigue and fever.   HENT: Negative for sore throat.    Eyes: Negative for discharge, itching and visual disturbance.  "  Respiratory: Negative for cough and shortness of breath.    Cardiovascular: Negative for chest pain.   Gastrointestinal: Negative for abdominal pain, constipation, diarrhea and vomiting.   Genitourinary: Negative for difficulty urinating.   Musculoskeletal: Negative for back pain and gait problem.   Skin: Positive for wound (to dorsal aspect of left middle finger).   Neurological: Negative for dizziness, light-headedness and numbness.   Psychiatric/Behavioral: Negative for self-injury and suicidal ideas.       Physical Exam   BP: 110/70  Pulse: 79  Temp: 97.3  F (36.3  C)  Resp: 16  Height: 172.7 cm (5' 8\")  Weight: 77.1 kg (170 lb)  SpO2: 100 %      Physical Exam  Constitutional:       General: He is not in acute distress.     Appearance: He is not ill-appearing, toxic-appearing or diaphoretic.   HENT:      Head: Normocephalic and atraumatic.      Right Ear: External ear normal.      Left Ear: External ear normal.   Cardiovascular:      Rate and Rhythm: Normal rate and regular rhythm.      Heart sounds: Normal heart sounds.   Pulmonary:      Effort: Pulmonary effort is normal.      Breath sounds: Normal breath sounds.   Abdominal:      General: Abdomen is flat. There is no distension.      Tenderness: There is no abdominal tenderness.   Musculoskeletal:         General: Normal range of motion.      Left hand: Laceration present.        Hands:       Comments: Two parallel lacerations present on the dorsal aspect of the PIP joint of left middle finger. The larger measuring 1.5 cm in length and about 2 mm deep. Smaller laceration measuring 1 cm in length and about 2 mm in depth. Bleeding not controlled.    Skin:     General: Skin is warm and dry.   Neurological:      Mental Status: He is alert and oriented to person, place, and time.      Sensory: No sensory deficit.   Psychiatric:         Mood and Affect: Mood normal.         Behavior: Behavior normal.         ED Course                 [facility]    -Laceration Repair    Date/Time: 6/26/2022 5:00 PM  Performed by: Libertad Byrne APRN CNP  Authorized by: Libertad Byrne APRN CNP     Risks, benefits and alternatives discussed.      ANESTHESIA (see MAR for exact dosages):     Anesthesia method:  Local infiltration    Local anesthetic:  Lidocaine 1% w/o epi  LACERATION DETAILS     Location:  Finger    Finger location:  L long finger    Length (cm):  1.5    Depth (mm):  2    REPAIR TYPE:     Repair type:  Simple      EXPLORATION:     Hemostasis achieved with:  Direct pressure    Wound exploration: wound explored through full range of motion and entire depth of wound probed and visualized      Wound extent: fascia not violated, no foreign body, no signs of injury, no nerve damage, no tendon damage, no underlying fracture and no vascular damage      Contaminated: no      TREATMENT:     Area cleansed with:  Hibiclens    Amount of cleaning:  Standard    Irrigation solution:  Tap water    Irrigation volume:  300    Irrigation method:  Tap    Visualized foreign bodies/material removed: no      SKIN REPAIR     Repair method:  Sutures    Suture size:  4-0    Suture material:  Nylon    Suture technique:  Simple interrupted    Number of sutures:  3    APPROXIMATION     Approximation:  Close    POST-PROCEDURE DETAILS     Dressing:  Antibiotic ointment, adhesive bandage and splint for protection        PROCEDURE  Describe Procedure: Laceration was inspected, cleaned, irrigated with tap water, and three sutures placed.   Patient Tolerance:  Patient tolerated the procedure well with no immediate complications      No results found for this or any previous visit (from the past 24 hour(s)).    Medications - No data to display    Assessments & Plan (with Medical Decision Making)   Finger assessment found flexion and extension intact. 2-point discrimination intact. No sign of tendon involvement. Sutures indicated. Laceration cleaned, irrigated, 3 sutures  placed, and wound dressed with bacitracin and bandaid. He received a Tetanus booster. Patient advised to return in 7-10 days for suture removal and monitor for signs of infection.       I have reviewed the nursing notes.    I have reviewed the findings, diagnosis, plan and need for follow up with the patient.  This data collected with the medical student working in the Emergency Department.  I repeated the history and physical exam with the patient.  Disclaimer: This note consists of symbols derived from keyboarding, dictation, and/or voice recognition software. As a result, there may be errors in the script that have gone undetected.  Please consider this when interpreting information found in the chart.    New Prescriptions    No medications on file       Final diagnoses:   Laceration of left middle finger w/o foreign body w/o damage to nail, initial encounter       6/26/2022   [facility] EMERGENCY DEPT     Libertad Byrne, LYNNE CNP  06/26/22 1936